# Patient Record
(demographics unavailable — no encounter records)

---

## 2025-06-19 NOTE — HISTORY OF PRESENT ILLNESS
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 Referred to DR Saenz, will be starting evenity soon. Needed to postpone due to teeth implants. No vb.  Hx of crohn dz., having flare currently. vaginal itching, on abx recently 2 weeks ago levaquin for sinus infection, no dc, outside not inside [Mammogramdate] : 6/2024 [PapSmeardate] : 5/2024 [BoneDensityDate] : 2/2024 [TextBox_37] : osteoporosis [ColonoscopyDate] : 2 yr ago

## 2025-06-19 NOTE — PHYSICAL EXAM
[Vulvar Atrophy] : vulvar atrophy [Labia Majora] : normal [Labia Majora Erythema] : erythema [Labia Minora] : normal [Labia Minora Erythema] : erythema [Atrophy] : atrophy [Uterine Adnexae] : non-palpable [Declined] : Patient declined rectal exam [Appropriately responsive] : appropriately responsive [Alert] : alert [No Acute Distress] : no acute distress [Soft] : soft [Non-tender] : non-tender [Non-distended] : non-distended [No HSM] : No HSM [No Lesions] : no lesions [No Mass] : no mass [Oriented x3] : oriented x3 [Examination Of The Breasts] : a normal appearance [Normal] : normal [No Masses] : no breast masses were palpable [Tenderness] : nontender [Enlarged ___ wks] : not enlarged [Mass ___ cm] : no uterine mass was palpated [FreeTextEntry4] : cx done [FreeTextEntry5] : pap not done

## 2025-06-19 NOTE — COUNSELING
[facility]    [address] [facility] [address]    Phone:  [phone]           Ector Colbert   MRN: [id number]    Department:  [facility]   Date of Visit:  2/13/2017           Diagnosis     Corneal abrasion, right, initial encounter        You were seen by Ovi Farooq DO.      Disclaimer     Follow-up Care:  It is your responsibility to arrange for follow-up care with your healthcare provider or as instructed. Call to get an appointment time.           Contact your doctor for follow-up appointment if not already scheduled.     Follow up with Frank Lama MD. Schedule an appointment as soon as possible for a visit in 2 days.    Specialty:  Ophthalmology    Contact information    [address] [facility] [address] [phone]        Preventive care and screening     Your blood pressure was (!) 168/108 today. If your blood pressure is higher than 120/80, we recommend follow up with your primary care provider to obtain basic health screening, including reassessment of your blood pressure, within three months.          Medications you received while in the ED through 02/13/2017  9:21 AM     Date/Time Order Dose Route Action    02/13/2017  9:14 AM proparacaine (ALCAINE) 0.5 % ophthalmic solution 1 drop 1 drop Right Eye Given         What to Do with Your Medications      START taking these medications today unless otherwise stated        Details    HYDROcodone-acetaminophen 5-325 MG per tablet   Commonly known as:  NORCO        Take 1 tablet by mouth every 4 hours as needed for Pain.   Authorizing Provider:  Ovi Farooq       trimethoprim-polymyxin b ophthalmic solution   Commonly known as:  POLYTRIM        Place 1 drop into right eye every 4 hours for 7 days.   Authorizing Provider:  Ovi Farooq                             Where to Get Your Medications      You can get these medications from any pharmacy     Bring a paper prescription for each of these medications    [Nutrition/ Exercise/ Weight Management] : nutrition, exercise, weight management HYDROcodone-acetaminophen 5-325 MG per tablet    trimethoprim-polymyxin b ophthalmic solution               Procedures     None      Imaging Results     None        Discharge Instructions         Corneal Abrasion    You have received a scratch or scrape (abrasion) to your cornea. The cornea is the clear part in the front of the eye. This sensitive area is very painful when injured. You may make tears frequently, and your vision may be blurry until the injury heals. You may be sensitive to light.  This part of the body heals quickly. You can expect the pain to go away within 24 to 48 hours. If the abrasion is large or deep, your doctor may apply an eye patch, although this is not always done. An antibiotic ointment or eye drops may also be used to prevent infection.  Numbing drops may be used to relieve the pain temporarily so that your eyes can be examined. However, these drops cannot be prescribed for home use because that would prevent healing and lead to more serious problems. Also, if you can’t feel your eye, there is a chance of accidentally injuring it further without knowing it.  Home care   · A cold pack (ice in a plastic bag, wrapped in a towel) may be applied over the eye (or eye patch) for 20 minutes at a time, to reduce pain.  · You may use acetaminophen or ibuprofen to control pain, unless another pain medicine was prescribed. Note: If you have chronic liver or kidney disease or ever had a stomach ulcer or GI bleeding, talk with your doctor before using these medicines.  · Rest your eyes and do not read until symptoms are gone.  · If you use contact lenses, do not wear them until all symptoms are gone.  · If your vision is affected by the corneal abrasion or if an eye patch was applied, do not drive a motor vehicle or operate machinery until all symptoms are gone. You may have trouble judging distances using only one eye.  · If your eyes are sensitive to light, try wearing sunglasses, or stay indoors  [Vitamins/Supplements] : vitamins/supplements [Breast Self Exam] : breast self exam until symptoms go away.  Follow-up care  Follow up with your health care provider, or as advised.  · If no patch was put on your eye, and used but the pain continues for more than 48 hours, you should have another exam. Return to this facility or contact your health care provider to arrange this.  · If your eye was patched and you were asked to remove the patch yourself, see your health care provider. You may also return to this facility if you still have pain after the patch is removed.  · If you were given a return appointment for patch removal and re-examination, be sure to keep the appointment. Leaving the patch in place longer than advised could be harmful.  When to seek medical advice  Call your health care provider right away if any of these occur.  · Increasing eye pain or pain that does not improve after 24 hours  · Discharge from the eye  · Increasing redness of the eye or swelling of the eyelids  · Worsening vision   · Symptoms that worsen after the abrasion has healed   © 5857-7350 [employer]. [address] [place] [address], [place] [address]. All rights reserved. This information is not intended as a substitute for professional medical care. Always follow your healthcare professional's instructions.          Discharge References/Attachments     None      Medication Safety: What you need to know     Maintain Security - It is important to keep all medications in a secure location:  Keep out of the reach of children and pets    Consider using a lock box or locked filing cabinet    Place pill bottles in private area such as bedroom or drawer    Don't Share - It is illegal to share your prescription medication, even with family:  The doctor prescribes medications specifically for you and your body    You cannot be sure how the drug may affect others physically or emotionally    It is a criminal offense to share prescriptions    Proper Disposal - It is no longer acceptable to flush or throw away  medications:  Recent studies show measurable amounts of medication have been found in drinking water and wildlife due to flushing or throwing away medications    Medication strength changes over time and is not typically safe after one year    Proper disposal removes the medication from your home in a safe way so that others don't have access to it. Use your local drug drop site.    Your local pharmacy can provide information on medication disposal options in your community. The Department [employer] Drug Enforcement Administration website also has information on safe medication disposal:  www.deadiversion.[facility].gov/drug_disposal/index.html